# Patient Record
Sex: MALE | Race: WHITE | Employment: FULL TIME | URBAN - METROPOLITAN AREA
[De-identification: names, ages, dates, MRNs, and addresses within clinical notes are randomized per-mention and may not be internally consistent; named-entity substitution may affect disease eponyms.]

---

## 2018-12-29 ENCOUNTER — HOSPITAL ENCOUNTER (EMERGENCY)
Facility: CLINIC | Age: 38
Discharge: HOME | End: 2018-12-29
Attending: NURSE PRACTITIONER | Admitting: NURSE PRACTITIONER

## 2018-12-29 VITALS — SYSTOLIC BLOOD PRESSURE: 119 MMHG | OXYGEN SATURATION: 97 % | DIASTOLIC BLOOD PRESSURE: 84 MMHG | TEMPERATURE: 97.7 F

## 2018-12-29 DIAGNOSIS — K02.9 TOOTH DECAYED: ICD-10-CM

## 2018-12-29 DIAGNOSIS — K08.89 TOOTH PAIN: ICD-10-CM

## 2018-12-29 PROCEDURE — 99282 EMERGENCY DEPT VISIT SF MDM: CPT

## 2018-12-29 RX ORDER — ACETAMINOPHEN AND CODEINE PHOSPHATE 300; 30 MG/1; MG/1
1-2 TABLET ORAL EVERY 6 HOURS PRN
Qty: 12 TABLET | Refills: 0 | Status: SHIPPED | OUTPATIENT
Start: 2018-12-29 | End: 2019-01-28

## 2018-12-29 RX ORDER — AMOXICILLIN 500 MG/1
500 CAPSULE ORAL 3 TIMES DAILY
Qty: 21 CAPSULE | Refills: 0 | Status: SHIPPED | OUTPATIENT
Start: 2018-12-29 | End: 2019-01-05

## 2018-12-29 NOTE — ED TRIAGE NOTES
Left upper and lower dental pain for the past 3 days.  Has an appointment on Monday but pain 10/10.  Patient alert and oriented x3.  Airway, breathing and circulation intact.

## 2018-12-29 NOTE — ED AVS SNAPSHOT
[facility] Emergency Department  [address]  [facility] [address]  Phone:  [phone]  Fax:  [phone]                                    Luis Garrett   MRN: [id number]    Department:  [facility] Emergency Department   Date of Visit:  12/29/2018           After Visit Summary Signature Page    I have received my discharge instructions, and my questions have been answered. I have discussed any challenges I see with this plan with the nurse or doctor.    ..........................................................................................................................................  Patient/Patient Representative Signature      ..........................................................................................................................................  Patient Representative Print Name and Relationship to Patient    ..................................................               ................................................  Date                                   Time    ..........................................................................................................................................  Reviewed by Signature/Title    ...................................................              ..............................................  Date                                               Time          22EPIC Rev 08/18

## 2018-12-30 NOTE — ED PROVIDER NOTES
History     Chief Complaint:  Dental Pain    HPI   Luis Garrett is a 38 year old male who presents to the emergency department today with dental pain. Patient reports pain in 2 top teeth and 1 bottom tooth. Patient has a dental appointment on Monday. He is requesting pain medication to get him through this weekend.     Allergies:  No Known Drug Allergies     Medications:    The patient is not currently taking any prescribed medications.     Past Medical History:    Inguinal hernia   Left ankle fracture     Past Surgical History:    Tooth extraction     Family History:    History reviewed. No pertinent family history.     Social History:  The patient was alone.  Smoking Status: Current every day  Alcohol Use: Yes   Marital Status:  Single     Review of Systems   HENT: Positive for dental problem.    All other systems reviewed and are negative.      Physical Exam     Patient Vitals for the past 24 hrs:   BP Temp Temp src Heart Rate SpO2   12/29/18 1758 119/84 97.7  F (36.5  C) Oral 65 97 %      Physical Exam    General: Appears stated age  HENT: several teeth have fillings, left upper molar decayed and broken, no facial swelling or cellulitis   Eyes: No scleral injection.    Neck: Supple with normal ROM.   Cardio: Regular rate and rhythm  Pulmonary/Chest: Clear to ausculation bilaterally.    Musculoskeletal: Normal gait.    Lymph: No anterior or posterior lymphadenopathy.   Neuro: Alert and oriented.   Skin: Normal color and temperature, no rashes to visible exposed skin.   Psych: Mood and affect normal.      Emergency Department Course   Emergency Department Course:  Nursing notes and vitals reviewed.  1804: I performed an exam of the patient as documented above.   1808: Findings and plan explained to the Patient. Patient discharged home with instructions regarding supportive care, medications, and reasons to return. The importance of close follow-up was reviewed. The patient was prescribed Amoxil and  Tylenol 3.    I personally answered all related questions prior to discharge.      Impression & Plan    Medical Decision Making:    Luis presents for evaluation of tooth pain. There is no abscess detected around the tooth amenable to incision and drainage. The differential diagnosis includes: cracked tooth syndrome, pulpitis, sub-apical abscess, facial cellulitis, alveolitis amongst others. There is no evidence of deep space infections, significant facial swelling or Sean's angina. There are no posterior pharyngeal space infections detected. Follow up with a dentist in the coming days is indicated for further work up and treatment. Provided dental list.       Diagnosis:    ICD-10-CM    1. Tooth pain K08.89    2. Tooth decayed K02.9        Disposition:  discharged to home    Discharge Medications:     Medication List      Started    acetaminophen-codeine 300-30 MG per tablet  Commonly known as:  TYLENOL WITH CODEINE #3  1-2 tablets, Oral, EVERY 6 HOURS PRN     amoxicillin 500 MG capsule  Commonly known as:  AMOXIL  500 mg, Oral, 3 TIMES DAILY              Katelin Rader  12/29/2018   [facility] EMERGENCY DEPARTMENT       Sidney Christensen, APRN CNP  12/29/18 2023

## 2020-05-05 ENCOUNTER — HOSPITAL ENCOUNTER (EMERGENCY)
Facility: CLINIC | Age: 40
Discharge: HOME | End: 2020-05-05
Attending: EMERGENCY MEDICINE | Admitting: EMERGENCY MEDICINE

## 2020-05-05 VITALS
RESPIRATION RATE: 18 BRPM | OXYGEN SATURATION: 95 % | DIASTOLIC BLOOD PRESSURE: 88 MMHG | SYSTOLIC BLOOD PRESSURE: 128 MMHG | BODY MASS INDEX: 30.14 KG/M2 | HEART RATE: 99 BPM | HEIGHT: 68 IN | WEIGHT: 198.85 LBS | TEMPERATURE: 98.2 F

## 2020-05-05 DIAGNOSIS — F41.0 PANIC ATTACK: ICD-10-CM

## 2020-05-05 DIAGNOSIS — G89.29 OTHER CHRONIC PAIN: ICD-10-CM

## 2020-05-05 DIAGNOSIS — B35.3 TINEA PEDIS OF BOTH FEET: ICD-10-CM

## 2020-05-05 LAB — INTERPRETATION ECG - MUSE: NORMAL

## 2020-05-05 PROCEDURE — 99284 EMERGENCY DEPT VISIT MOD MDM: CPT

## 2020-05-05 PROCEDURE — 93005 ELECTROCARDIOGRAM TRACING: CPT

## 2020-05-05 RX ORDER — FLUCONAZOLE 150 MG/1
TABLET ORAL
Qty: 2 TABLET | Refills: 0 | Status: SHIPPED | OUTPATIENT
Start: 2020-05-05 | End: 2020-05-08

## 2020-05-05 RX ORDER — HYDROXYZINE HYDROCHLORIDE 25 MG/1
25 TABLET, FILM COATED ORAL 3 TIMES DAILY PRN
Qty: 20 TABLET | Refills: 0 | Status: SHIPPED | OUTPATIENT
Start: 2020-05-05

## 2020-05-05 ASSESSMENT — ENCOUNTER SYMPTOMS
VOMITING: 0
BACK PAIN: 1
COUGH: 1
NERVOUS/ANXIOUS: 1
NUMBNESS: 1
CHEST TIGHTNESS: 1
DIARRHEA: 0
ABDOMINAL PAIN: 0
FEVER: 0
SHORTNESS OF BREATH: 1
DIAPHORESIS: 1
NAUSEA: 0

## 2020-05-05 ASSESSMENT — MIFFLIN-ST. JEOR: SCORE: 1791.5

## 2020-05-05 NOTE — ED AVS SNAPSHOT
[facility] Emergency Department  [address]  [facility] [address]  Phone:  [phone]  Fax:  [phone]                                    Luis Garrett   MRN: [id number]    Department:  [facility] Emergency Department   Date of Visit:  5/5/2020           After Visit Summary Signature Page    I have received my discharge instructions, and my questions have been answered. I have discussed any challenges I see with this plan with the nurse or doctor.    ..........................................................................................................................................  Patient/Patient Representative Signature      ..........................................................................................................................................  Patient Representative Print Name and Relationship to Patient    ..................................................               ................................................  Date                                   Time    ..........................................................................................................................................  Reviewed by Signature/Title    ...................................................              ..............................................  Date                                               Time          22EPIC Rev 08/18

## 2020-05-05 NOTE — ED TRIAGE NOTES
"Here concern for panic attack, had about 9 panic attack today. Sob, sweating, feeling hot, chest tightness, arm numbness. Has been in \"higher level of stress\" at work. ABCs intact.   "

## 2020-05-05 NOTE — ED PROVIDER NOTES
History     Chief Complaint:  Anxiety     HPI   Luis Garrett is a 39 year old male who presents for evaluation of anxiety. 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 This morning around 0930 the patient started to feel increasingly anxious and had an episode of diaphoresis, chest tightness, shortness of breath, and numbness and tingling in his bilateral arms. Throughout the day the patient has had approximately nine similar episodes, and each episode lasts for 2-10 minutes. Due to concern for these recent symptoms the patient came into the ED with primary concern that he could be having panic attacks. The patient reports that he has been having similar episodes intermittent for the last 5-6 months, and he often has 1-2 episodes a day. Otherwise, he reports that he has had an intermittently productive cough for the last several weeks but he denies any recent fever, abdominal pain, nausea, vomiting, or diarrhea. He also reports that he has had a rash on his bilateral feet recently and a several month history of chronic back pain.   No exogenous estrogen, no hemoptysis, no recent immobilization or surgery, no prior PE or DVT, no history of malignancy  No HTN, no HLD, no DM, +smoking, no immediate family history of early MI.  Also c/o intermittent itchy rash to b/l feet.    Allergies:  NKDA      Medications:    The patient is not currently taking any prescribed medications.      Past Medical History:    Inguinal hernia     Past Surgical History:    Tooth extraction with forceps     Family History:    History reviewed. No pertinent family history.     Social History:  Tobacco use:    Former smoker   Alcohol use:    Positive   Drug use:    Negative   Marital status:    Single   Accompanied to ED by:  Alone      Review of Systems   Constitutional: Positive for diaphoresis. Negative for fever.   Respiratory: Positive for cough, chest tightness  "and shortness of breath.    Gastrointestinal: Negative for abdominal pain, diarrhea, nausea and vomiting.   Musculoskeletal: Positive for back pain.   Skin: Positive for rash (feet).   Neurological: Positive for numbness.   Psychiatric/Behavioral: The patient is nervous/anxious.    All other systems reviewed and are negative.      Physical Exam     Patient Vitals for the past 24 hrs:   BP Temp Temp src Pulse Heart Rate Resp SpO2 Height Weight   05/05/20 0208 128/88 98.2  F (36.8  C) Oral 99 99 18 95 % 1.727 m (5' 8\") 90.2 kg (198 lb 13.7 oz)      Physical Exam  I have reviewed the triage vital signs    Constitutional: Appears stated age  Eyes: No discharge, symmetrical lids  ENT: Moist mucous membranes, no ear discharge  Neck: Full range of motion  Respiratory: CTAB, no wheezes  Cardiovascular: Regular rate and rhythm, no lower extremity edema  Chest: Equal rise  Gastrointestinal: Soft. Nondistended. NTTP. No rebound or guarding  Musculoskeletal: No gross deformities.   Skin: Warm and well perfused. Erythematous blanching nontender rash to bilateral plantar surfaces of feet.  Neurologic: Moves all extremities, speech fluent without dysarthria  Psychiatric: Appropriate affect, alert and interactive     Emergency Department Course   ECG (03:20:09):  Indication: Screening for cardiovascular disease.   Rate 84 bpm. TN interval 210 ms. QRS duration 96 ms. QT/QTc 372/439 ms. P-R-T axes 63 -27 25.   Interpretation: Sinus rhythm with 1st degree AV block, Otherwise normal ECG   Agree with computer interpretation. Yes    Interpreted at 0325 by Dr. Ceballos.      Emergency Department Course:  Nursing notes and vitals reviewed.  0256: I performed an exam of the patient as documented above.     Findings and plan explained to the Patient. Patient discharged home with instructions regarding supportive care, medications, and reasons to return. The importance of close follow-up was reviewed. The patient was prescribed Atarax and " Fluconazole.      Impression & Plan      Medical Decision MakinM presenting with minutes-long episodes of chest tightness and SOB.  DDx includes but is not limited to panic attack, doubt ACS, doubt PE, doubt COVID.  By history, atypical for ACS.  Heart score is 1.  EKG is nonischemic.  Low suspicion for ACS.  Patient is low risk for PE by PERC rule.  Score is 0.  Low suspicion for PE.    No fever, no infectious symptoms, doubt pneumonia.  No clinical evidence of heart failure, EKG without characteristic findings, doubt myocarditis or pericarditis.  No risk factors for aortic pathology, patient is comfortable appearing, no characteristic radiation or tearing description, VSS.  Doubt aortic pathology.   Pt also with fungal appearing rash to b/l feet; will trial diflucan.  Thankfully, pts complaints more subacute or chronic than emergent.  Advised f/u with PCP for management of chronic back pain, panic attacks, rash, and for referral to psychiatry.     Covid-19  Luis Garrett was evaluated during a global COVID-19 pandemic, which necessitated consideration that the patient might be at risk for infection with the SARS-CoV-2 virus that causes COVID-19.   Applicable protocols for evaluation were followed during the patient's care.      Diagnosis:    ICD-10-CM   1. Panic attack  F41.0   2. Tinea pedis of both feet  B35.3   3. Other chronic pain  G89.29      Disposition:  Discharged to home with Atarax and Fluconazole.     Discharge Medications:  New Prescriptions    FLUCONAZOLE (DIFLUCAN) 150 MG TABLET    Take one tablet now, and one tablet in three days    HYDROXYZINE (ATARAX) 25 MG TABLET    Take 1 tablet (25 mg) by mouth 3 times daily as needed for anxiety       Oh MARSHALL, am serving as a scribe at 2:56 AM on 2020 to document services personally performed by Contreras Ceballos MD, based on my observations and the provider's statements to me.    [facility] EMERGENCY DEPARTMENT       Tucker  Contreras Michael MD  05/05/20 0504